# Patient Record
Sex: MALE | Race: WHITE | NOT HISPANIC OR LATINO | URBAN - METROPOLITAN AREA
[De-identification: names, ages, dates, MRNs, and addresses within clinical notes are randomized per-mention and may not be internally consistent; named-entity substitution may affect disease eponyms.]

---

## 2017-05-22 ENCOUNTER — RECORDS - HEALTHEAST (OUTPATIENT)
Dept: GENERAL RADIOLOGY | Facility: CLINIC | Age: 27
End: 2017-05-22

## 2017-05-22 ENCOUNTER — OFFICE VISIT - HEALTHEAST (OUTPATIENT)
Dept: FAMILY MEDICINE | Facility: CLINIC | Age: 27
End: 2017-05-22

## 2017-05-22 DIAGNOSIS — M79.672 PAIN IN LEFT FOOT: ICD-10-CM

## 2017-05-22 DIAGNOSIS — M79.672 FOOT PAIN, LEFT: ICD-10-CM

## 2017-06-21 ENCOUNTER — OFFICE VISIT - HEALTHEAST (OUTPATIENT)
Dept: PODIATRY | Facility: CLINIC | Age: 27
End: 2017-06-21

## 2017-06-21 DIAGNOSIS — L84 TYLOMA: ICD-10-CM

## 2017-06-21 DIAGNOSIS — M77.8 CAPSULITIS OF FOOT, LEFT: ICD-10-CM

## 2017-07-26 ENCOUNTER — OFFICE VISIT - HEALTHEAST (OUTPATIENT)
Dept: FAMILY MEDICINE | Facility: CLINIC | Age: 27
End: 2017-07-26

## 2017-07-26 DIAGNOSIS — S39.012A LOW BACK STRAIN: ICD-10-CM

## 2017-07-26 DIAGNOSIS — H61.20 CERUMEN IMPACTION: ICD-10-CM

## 2017-10-03 ENCOUNTER — OFFICE VISIT - HEALTHEAST (OUTPATIENT)
Dept: FAMILY MEDICINE | Facility: CLINIC | Age: 27
End: 2017-10-03

## 2017-10-03 ENCOUNTER — AMBULATORY - HEALTHEAST (OUTPATIENT)
Dept: FAMILY MEDICINE | Facility: CLINIC | Age: 27
End: 2017-10-03

## 2017-10-03 DIAGNOSIS — H61.20 CERUMEN IMPACTION: ICD-10-CM

## 2017-10-03 DIAGNOSIS — R11.0 NAUSEA: ICD-10-CM

## 2017-10-03 DIAGNOSIS — R42 DIZZINESS: ICD-10-CM

## 2017-10-03 RX ORDER — ASPIRIN 325 MG
325 TABLET ORAL DAILY
Status: SHIPPED | COMMUNITY
Start: 2017-10-03

## 2017-10-03 RX ORDER — OXYCODONE HYDROCHLORIDE 5 MG/1
TABLET ORAL
Refills: 0 | Status: SHIPPED | COMMUNITY
Start: 2017-10-02

## 2017-10-03 RX ORDER — HYDROXYZINE HYDROCHLORIDE 25 MG/1
TABLET, FILM COATED ORAL
Refills: 0 | Status: SHIPPED | COMMUNITY
Start: 2017-09-22

## 2017-10-03 RX ORDER — METHOCARBAMOL 500 MG/1
TABLET, FILM COATED ORAL
Refills: 0 | Status: SHIPPED | COMMUNITY
Start: 2017-09-22

## 2017-10-03 RX ORDER — ACETAMINOPHEN 500 MG
500 TABLET ORAL EVERY 6 HOURS PRN
Status: SHIPPED | COMMUNITY
Start: 2017-10-03

## 2021-05-31 VITALS — WEIGHT: 218.7 LBS

## 2021-05-31 VITALS — WEIGHT: 209 LBS

## 2021-05-31 VITALS — WEIGHT: 215 LBS

## 2021-06-10 NOTE — PROGRESS NOTES
Subjective:      Patient ID: Kelby Tbubs is a 27 y.o. male.    Chief Complaint:    HPI  Kelby Tubbs is a 27 y.o. male who presents today complaining of foot pain on the bottom of the left foot for 3 weeks.  He denies any injury.  No history of similar pain.  He reports it hurts everyday and is worse after he has been on his feet all day.  It does sometimes keep him up at night.  The pain is on the plantar surface of the foot just proximal to the 5th digit.  It isn't as painful when he wakes in the morning.  He has been putting some over the counter wart treatment on in for two days about a week ago.  He took Ibuprofen for two days but it wasn't helpful.  f     Past Medical History:   Diagnosis Date     Backache     Created by Conversion  Replacement Utility updated for latest IMO load     Lipoma Of The Adipose Tissue     Created by [employer] [facility] Annotation: Apr 25 2012 11:23AM - Celso Prescott: right forearm.  Replacement Utility updated for latest IMO load       No past surgical history on file.    No family history on file.    Social History   Substance Use Topics     Smoking status: Current Every Day Smoker     Packs/day: 0.50     Types: Cigarettes     Smokeless tobacco: Never Used     Alcohol use Yes      Comment: Ocassionally       Review of Systems    Objective:     /64  Pulse 88  Temp 97.6  F (36.4  C) (Oral)   Resp 12  Wt 218 lb 11.2 oz (99.2 kg)  SpO2 95%    Physical Exam   Musculoskeletal:   There is a possible bunionette at the left 5th digit.  There is callous formation but no verucous lesion and no end vessels seen.  No erythema or swelling.  He does have tenderness to palpation at the base of the 5th toe.     Procedures    Xr Foot Left 3 Or More Vws    Result Date: 5/22/2017  XR FOOT LEFT 3 OR MORE VWS 5/22/2017 5:44 PM INDICATION: pain proximal to 5th digit COMPARISON: None. FINDINGS: Negative foot. No fracture or dislocation. No foreign material seen. This report was  electronically interpreted by: Dr. Vicente Olmedo MD ON 05/22/2017 at 17:56     Films also personally reviewed.      Assessment / Plan:     1. Foot pain, left  XR Foot Left 3 or More VWS    Ambulatory referral to Podiatry       There is significant callus formation and this may be part of the cause for his pain.  I do not see any definite verrucous lesion.  He does have some inward deviation of the fifth toe as well.  Since the etiology of his symptoms is not entirely clear I would like him to be evaluated by the podiatrist.  Referral was placed and the patient is agreeable with this plan.    Patient Instructions   1) Xray is normal.  2) Evaluation with podiatry.  Referral was placed, you should receive a call to schedule in 3-5 days.  3) Ibuprofen as needed for pain.

## 2021-06-11 NOTE — PROGRESS NOTES
Admission History & Physical  Kelby Tubbs, 1990, [id number]    [facility]  No Primary Care Provider, None    Extended Emergency Contact Information  Primary Emergency Contact: Chelle Tubbs   [place]  Mobile Phone: [phone]  Relation: Mother  Secondary Emergency Contact: Jonel Tubbs   [place]  Mobile Phone: [phone]  Relation: Father     Assessment and Plan:   Assessment: Pronation deformity, tailor's buni  Chief Complaint:  left foot pain      HPI:    Kelby Tubbs is a 27 y.o. old male who presented to the clinic today complaining of pain involving his left foot.  He stated that the pain is located near the joint of the small toe.  He has had this for several weeks.  The pain is aggravated by the end of the day after prolonged weightbearing and ambulation.  He also complained of a thick callus on the bottom of the foot near the small toe.  He was using wart remover and this exacerbated his condition.  He describes it as an aching type pain which is relieved with nonweightbearing.  He has not had any associated redness or swelling with his symptoms.  He denies any other previous treatment.  History is provided by patient    Medical History  Active Ambulatory (Non-Hospital) Problems    Diagnosis     Lipoma Of The Adipose Tissue     Acute Sinusitis     Backache     Past Medical History:   Diagnosis Date     Backache      Lipoma Of The Adipose Tissue      Patient Active Problem List    Diagnosis Date Noted     Lipoma Of The Adipose Tissue      Acute Sinusitis      Backache      Surgical History  He  has no past surgical history on file.   History reviewed. No pertinent surgical history. Social History  Reviewed, and he  reports that he has been smoking Cigarettes.  He has been smoking about 0.50 packs per day. He has never used smokeless tobacco. He reports that he drinks alcohol. He reports that he does not use illicit drugs.  Social History   Substance  Use Topics     Smoking status: Current Every Day Smoker     Packs/day: 0.50     Types: Cigarettes     Smokeless tobacco: Never Used     Alcohol use Yes      Comment: Ocassionally      Allergies  No Known Allergies Family History  Reviewed, and family history is not on file.   Psychosocial Needs  Social History     Social History Narrative     Additional psychosocial needs reviewed per nursing assessment.       Prior to Admission Medications     (Not in a hospital admission)        Review of Systems - Negativeon left foot, tyloma left foot  Plan: Debrided hyperkeratotic lesion left foot.  I have also recommended orthotics.  Active Problems:    * No active hospital problems. *         /68  Pulse 94  SpO2 97%    Objective findings: General: The patient is alert and in no acute distress      Integument: Nails bilateral feet are normal length and color. Skin bilateral feet warm and intact.  There is a thick hyperkeratotic lesion sub-fifth metatarsal head left foot.      Vascular: DP and PT pulses +2/4 bilateral feet.      Neurologic: Negative clonus, negative Babinski bilaterally.       Musculoskeletal: Range of motion within normal limits bilaterally. Muscle power +5/5 bilaterally in all compartments.     There is a subluxation of the fifth metatarsal phalangeal joint left foot.  There is pain on palpation of the fifth metatarsal phalangeal joint left foot.  There is a large firm palpable subcutaneous mass on the medial aspect of the head of the fifth metatarsal left foot.  There is flattening of the medial longitudinal arch noted bilaterally.      Assessment: Pronation deformity, tailor's bunion left foot, tyloma left foot       Plan: I debrided the hyperkeratotic lesion left foot.  I have recommended orthotics.  I informed the patient he may need surgical correction of his tailor's bunion in the future if his symptoms do not improve.

## 2021-06-12 NOTE — PROGRESS NOTES
"ASSESSMENT:   1. Low back strain  predniSONE (DELTASONE) 20 MG tablet   2. Cerumen impaction          PLAN:  1. Counseled differentials- consistent with muscle strain. Recommend application of ice to affected area 3-4 times daily for 15-20 minutes at a time.   Stop NSAIDS, start prednisone burst.  Reviewed side effects of this medication with patient and reasons to call/come in. Use proper lifting with avoidance of heavy lifting discussed.  Call a PCP for recheck first of next week if not responding as expected.  Reviewed red flags of back pain, including severe/worsening pain, numbness/weakness of LE/groin/genitals, incontinence, or fever, these would be reasons to seek care in the ER immediately. Patient handout on back rehabilitation exercises provided (Sabrina).  2. Counseled prevention of future cerumen impaction/build-up. May use OTC Debrox as needed for cerumen impaction.  Recheck with a PCP as needed, or if symptoms recur/worsening recheck with [facility] or PCP.     SUBJECTIVE: Kelby Tubbs is a 27 y.o. male who presents with 2 concerns:   1 -  low back pain for 2 weeks.  Aggravated by: prolonged sitting, bending.  Alleviated by: standing.  Precipitating factors: he was shoveling and heard a \"pop\" while lifting a shovel full of dirt and turning to dump it in a wheelbarrow.  Rates pain at a 8 out of 10. Prior history of back problems: recurrent self limited episodes of low back pain in the past.  There is radiation of pain to the left lateral thigh about detention down. There is no numbness or weakness in the legs. Has attempted treatment with ibuprofen 600mg 3-4 times a day with minimal relief. 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    2-Right ear feels plugged for 1 day. Had a headache last evening due to \"the pressure\".  Hearing is muffled on the right. He has a history of cerumen impaction and states this feels similar.     No recent fever, chills, URI symptoms.  " Denies ear drainage, ear pain, tinnitus. No incontinence, urinary retention, saddle-distribution anesthesia, unexpected weight loss.  ROS otherwise noncontributory.    Patient Active Problem List   Diagnosis     Lipoma Of The Adipose Tissue     Acute Sinusitis     Backache        Current Medications:  No current outpatient prescriptions on file prior to visit.     No current facility-administered medications on file prior to visit.         Allergies:  No Known Allergies     OBJECTIVE:   /70 (Patient Site: Right Arm, Patient Position: Sitting, Cuff Size: Adult Regular)  Pulse 86  Temp 97.7  F (36.5  C) (Oral)   Resp 20  Wt 209 lb (94.8 kg)  SpO2 95%     General:Alert, polite, cooperative. Appears healthy. Patient appears to be in no pain when sitting but cautiously moves to exam table  HEENT: Bilateral external ear canals occluded with cerumen.  Nasal mucosa pink. Oral mucosa pink and moist. Free of erythema.    Lungs: Chest is clear, no wheezing or rales. Symmetric air entry throughout both lung fields.   Heart: regular rate and rhythm, no murmur, rub or gallop  MS: Slow otherwise normal gait noted, including heel and toe walking.  Inspection shows no erythema, inflammation or deformity. There is tenderness along the paravetebrals in the bilateral lumbar region. There is no spine bony tenderness or mass. Has full AROM of the back, tenderness with forward flexion and twisting bilaterally.    Neurologic: Strength in the major muscle groups of UE/LE intact and equal. DTRs in UE/LE intact and equal. Straight leg raise induces back pain with raising each leg, but no pain down the leg.      PROCEDURE NOTE:  The bilateral ear(s) were instilled with liquid colace then flushed with warm water without difficulty. Cerumen is removed by gentle syringing.  The patient tolerated the procedure well.  Examination of the ear post cerumen removal: External ears essentially normal. Canals clear of debris. TM's normal.   Hearing improved and symptoms resolved.
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
## 2025-01-18 ENCOUNTER — HEALTH MAINTENANCE LETTER (OUTPATIENT)
Age: 35
End: 2025-01-18